# Patient Record
Sex: MALE | Race: BLACK OR AFRICAN AMERICAN | NOT HISPANIC OR LATINO | URBAN - METROPOLITAN AREA
[De-identification: names, ages, dates, MRNs, and addresses within clinical notes are randomized per-mention and may not be internally consistent; named-entity substitution may affect disease eponyms.]

---

## 2021-04-13 DIAGNOSIS — Z23 ENCOUNTER FOR IMMUNIZATION: ICD-10-CM

## 2023-06-14 ENCOUNTER — APPOINTMENT (RX ONLY)
Dept: URBAN - METROPOLITAN AREA CLINIC 23 | Facility: CLINIC | Age: 53
Setting detail: DERMATOLOGY
End: 2023-06-14

## 2023-06-14 DIAGNOSIS — L91.8 OTHER HYPERTROPHIC DISORDERS OF THE SKIN: ICD-10-CM

## 2023-06-14 DIAGNOSIS — B07.8 OTHER VIRAL WARTS: ICD-10-CM

## 2023-06-14 PROCEDURE — 99202 OFFICE O/P NEW SF 15 MIN: CPT

## 2023-06-14 PROCEDURE — ? COUNSELING

## 2023-06-14 PROCEDURE — ? CPT CODE GENERATOR

## 2023-06-14 ASSESSMENT — LOCATION SIMPLE DESCRIPTION DERM: LOCATION SIMPLE: LEFT UPPER BACK

## 2023-06-14 ASSESSMENT — LOCATION DETAILED DESCRIPTION DERM: LOCATION DETAILED: LEFT SUPERIOR LATERAL UPPER BACK

## 2023-06-14 ASSESSMENT — LOCATION ZONE DERM: LOCATION ZONE: TRUNK

## 2023-06-14 NOTE — PROCEDURE: CPT CODE GENERATOR
33536 Price: 0.00
Was Pdt Performed By Md, , Melissa Or Np?: No
Fee Schedule Discount For Cash Pay Patients In % Off Of Fee Schedule: 0
First Procedure You Would Like A Quote For?: Benign Destruction
Anticipated Depth And Size Of Soft Tissue Excision (Required): Subcutaneous, Less than 3 cm
J Code Units: 1
First Biopsy Type: Tangential Biopsy
How Many Lesions Are You Destroying?: Less than 15
Number Of Lesions Injected: Less than 8 lesions
Fee Schedule Discount In % Off Of Fee Schedule: Standard Fee Schedule as Entered in Pricing Tab
Anticipated Depth And Size Of Soft Tissue Excision (Required): Subcutaneous, Less than 2 cm
Which Photosensitizer Was Used?: Levulan
Anticipated Depth And Size Of Soft Tissue Excision (Required): Subcutaneous, Less than 1.5 cm
Detail Level: None

## 2023-06-29 ENCOUNTER — APPOINTMENT (RX ONLY)
Dept: URBAN - METROPOLITAN AREA CLINIC 23 | Facility: CLINIC | Age: 53
Setting detail: DERMATOLOGY
End: 2023-06-29

## 2023-06-29 DIAGNOSIS — B07.8 OTHER VIRAL WARTS: ICD-10-CM

## 2023-06-29 DIAGNOSIS — L91.8 OTHER HYPERTROPHIC DISORDERS OF THE SKIN: ICD-10-CM

## 2023-06-29 PROCEDURE — ? COUNSELING

## 2023-06-29 PROCEDURE — ? BENIGN DESTRUCTION

## 2023-06-29 PROCEDURE — 17110 DESTRUCTION B9 LES UP TO 14: CPT

## 2023-06-29 PROCEDURE — ? LIQUID NITROGEN

## 2023-06-29 ASSESSMENT — LOCATION DETAILED DESCRIPTION DERM
LOCATION DETAILED: LEFT AXILLARY VAULT
LOCATION DETAILED: LEFT POSTERIOR AXILLA
LOCATION DETAILED: RIGHT CENTRAL LATERAL NECK
LOCATION DETAILED: LEFT SUPERIOR LATERAL UPPER BACK
LOCATION DETAILED: RIGHT AXILLARY VAULT
LOCATION DETAILED: LEFT CENTRAL LATERAL NECK

## 2023-06-29 ASSESSMENT — LOCATION SIMPLE DESCRIPTION DERM
LOCATION SIMPLE: LEFT AXILLARY VAULT
LOCATION SIMPLE: RIGHT AXILLARY VAULT
LOCATION SIMPLE: LEFT UPPER BACK
LOCATION SIMPLE: NECK
LOCATION SIMPLE: LEFT POSTERIOR AXILLA

## 2023-06-29 ASSESSMENT — LOCATION ZONE DERM
LOCATION ZONE: TRUNK
LOCATION ZONE: NECK
LOCATION ZONE: AXILLAE

## 2023-06-29 NOTE — PROCEDURE: LIQUID NITROGEN
Render Post-Care Instructions In Note?: no
Show Spray Paint Technique Variable?: Yes
Spray Paint Text: The liquid nitrogen was applied to the skin utilizing a spray paint frosting technique.
Medical Necessity Clause: This procedure was medically necessary because the lesions that were treated were:
Medical Necessity Information: It is in your best interest to select a reason for this procedure from the list below. All of these items fulfill various CMS LCD requirements except the new and changing color options.
Detail Level: Detailed
Consent: The patient's consent was obtained including but not limited to risks of crusting, scabbing, blistering, scarring, darker or lighter pigmentary change, recurrence, incomplete removal and infection.
Number Of Freeze-Thaw Cycles: 3 freeze-thaw cycles
Post-Care Instructions: I reviewed with the patient in detail post-care instructions. Patient is to wear sunprotection, and avoid picking at any of the treated lesions. Pt may apply Vaseline to crusted or scabbing areas.

## 2023-06-29 NOTE — PROCEDURE: BENIGN DESTRUCTION
Number Of Freeze-Thaw Cycles: 3 freeze-thaw cycles
Render Post-Care Instructions In Note?: no
Treatment Number (Will Not Render If 0): 0
Medical Necessity Information: It is in your best interest to select a reason for this procedure from the list below. All of these items fulfill various CMS LCD requirements except the new and changing color options.
Detail Level: Detailed
Topical Anesthesia?: 7% lidocaine, 7% tetracaine
Post-Care Instructions: I reviewed with the patient in detail post-care instructions. Patient is to wear sunprotection, and avoid picking at any of the treated lesions. Pt may apply Vaseline to crusted or scabbing areas.
Medical Necessity Clause: This procedure was medically necessary because the lesions that were treated were:
Consent: The patient's verbal consent was obtained including but not limited to risks of crusting, scabbing, blistering, scarring, darker or lighter pigmentary change, recurrence, incomplete removal and infection.

## 2024-10-22 ENCOUNTER — APPOINTMENT (RX ONLY)
Dept: URBAN - METROPOLITAN AREA CLINIC 23 | Facility: CLINIC | Age: 54
Setting detail: DERMATOLOGY
End: 2024-10-22

## 2024-10-22 DIAGNOSIS — L98419 CHRONIC ULCER OF OTHER SPECIFIED SITES: ICD-10-CM

## 2024-10-22 DIAGNOSIS — L98429 CHRONIC ULCER OF OTHER SPECIFIED SITES: ICD-10-CM

## 2024-10-22 DIAGNOSIS — B07.8 OTHER VIRAL WARTS: ICD-10-CM

## 2024-10-22 DIAGNOSIS — L91.8 OTHER HYPERTROPHIC DISORDERS OF THE SKIN: ICD-10-CM

## 2024-10-22 PROBLEM — D48.5 NEOPLASM OF UNCERTAIN BEHAVIOR OF SKIN: Status: ACTIVE | Noted: 2024-10-22

## 2024-10-22 PROBLEM — L97.319 NON-PRESSURE CHRONIC ULCER OF RIGHT ANKLE WITH UNSPECIFIED SEVERITY: Status: ACTIVE | Noted: 2024-10-22

## 2024-10-22 PROCEDURE — ? COUNSELING

## 2024-10-22 PROCEDURE — ? DEFER

## 2024-10-22 PROCEDURE — 99213 OFFICE O/P EST LOW 20 MIN: CPT | Mod: 25

## 2024-10-22 PROCEDURE — ? BIOPSY BY SHAVE METHOD

## 2024-10-22 PROCEDURE — ? OTC TREATMENT REGIMEN

## 2024-10-22 PROCEDURE — 11102 TANGNTL BX SKIN SINGLE LES: CPT

## 2024-10-22 ASSESSMENT — LOCATION DETAILED DESCRIPTION DERM
LOCATION DETAILED: RIGHT ANKLE
LOCATION DETAILED: RIGHT LATERAL INFERIOR EYELID
LOCATION DETAILED: LEFT CLAVICULAR NECK
LOCATION DETAILED: LEFT AXILLARY VAULT
LOCATION DETAILED: LEFT INFERIOR LATERAL NECK
LOCATION DETAILED: RIGHT AXILLARY VAULT

## 2024-10-22 ASSESSMENT — LOCATION ZONE DERM
LOCATION ZONE: AXILLAE
LOCATION ZONE: LEG
LOCATION ZONE: NECK
LOCATION ZONE: EYELID

## 2024-10-22 ASSESSMENT — LOCATION SIMPLE DESCRIPTION DERM
LOCATION SIMPLE: LEFT AXILLARY VAULT
LOCATION SIMPLE: RIGHT ANKLE
LOCATION SIMPLE: RIGHT INFERIOR EYELID
LOCATION SIMPLE: RIGHT AXILLARY VAULT
LOCATION SIMPLE: LEFT ANTERIOR NECK

## 2024-10-22 NOTE — PROCEDURE: BIOPSY BY SHAVE METHOD
Detail Level: Detailed
Depth Of Biopsy: dermis
Was A Bandage Applied: Yes
Size Of Lesion In Cm: 0
Biopsy Type: H and E
Biopsy Method: scissors
Anesthesia Type: 1% lidocaine without epinephrine
Anesthesia Volume In Cc: 1
Hemostasis: Drysol
Wound Care: Petrolatum
Dressing: bandage
Destruction After The Procedure: No
Type Of Destruction Used: Curettage
Curettage Text: The wound bed was treated with curettage after the biopsy was performed.
Cryotherapy Text: The wound bed was treated with cryotherapy after the biopsy was performed.
Electrodesiccation Text: The wound bed was treated with electrodesiccation after the biopsy was performed.
Electrodesiccation And Curettage Text: The wound bed was treated with electrodesiccation and curettage after the biopsy was performed.
Silver Nitrate Text: The wound bed was treated with silver nitrate after the biopsy was performed.
Lab: -[id number]
Lab Facility: [id number]
Consent: Written consent was obtained and risks were reviewed including but not limited to scarring, infection, bleeding, scabbing, incomplete removal, nerve damage and allergy to anesthesia.
Post-Care Instructions: I reviewed with the patient in detail post-care instructions. Patient is to keep the biopsy site dry overnight, and then apply bacitracin twice daily until healed. Patient may apply hydrogen peroxide soaks to remove any crusting.
Notification Instructions: Patient will be notified of biopsy results. However, patient instructed to call the office if not contacted within 2 weeks.
Billing Type: Third-Party Bill
Information: Selecting Yes will display possible errors in your note based on the variables you have selected. This validation is only offered as a suggestion for you. PLEASE NOTE THAT THE VALIDATION TEXT WILL BE REMOVED WHEN YOU FINALIZE YOUR NOTE. IF YOU WANT TO FAX A PRELIMINARY NOTE YOU WILL NEED TO TOGGLE THIS TO 'NO' IF YOU DO NOT WANT IT IN YOUR FAXED NOTE.

## 2024-10-28 ENCOUNTER — APPOINTMENT (RX ONLY)
Dept: URBAN - METROPOLITAN AREA CLINIC 23 | Facility: CLINIC | Age: 54
Setting detail: DERMATOLOGY
End: 2024-10-28

## 2024-10-28 DIAGNOSIS — L91.8 OTHER HYPERTROPHIC DISORDERS OF THE SKIN: ICD-10-CM

## 2024-10-28 PROCEDURE — 11200 RMVL SKIN TAGS UP TO&INC 15: CPT

## 2024-10-28 PROCEDURE — ? SKIN TAG REMOVAL MULTI

## 2024-10-28 ASSESSMENT — LOCATION SIMPLE DESCRIPTION DERM
LOCATION SIMPLE: RIGHT AXILLARY VAULT
LOCATION SIMPLE: LEFT ANTERIOR NECK
LOCATION SIMPLE: RIGHT ANTERIOR NECK
LOCATION SIMPLE: LEFT AXILLARY VAULT

## 2024-10-28 ASSESSMENT — LOCATION ZONE DERM
LOCATION ZONE: NECK
LOCATION ZONE: AXILLAE

## 2024-10-28 ASSESSMENT — LOCATION DETAILED DESCRIPTION DERM
LOCATION DETAILED: LEFT CLAVICULAR NECK
LOCATION DETAILED: RIGHT CLAVICULAR NECK
LOCATION DETAILED: RIGHT AXILLARY VAULT
LOCATION DETAILED: LEFT AXILLARY VAULT

## 2024-10-28 NOTE — PROCEDURE: SKIN TAG REMOVAL MULTI
Medical Necessity Clause: This procedure was medically necessary because the lesions that were treated were:
Hemostasis: Aluminum Chloride
Total Number Of Lesions Treated: 1
Medical Necessity Information: It is in your best interest to select a reason for this procedure from the list below. All of these items fulfill various CMS LCD requirements except the new and changing color options.
Consent: Written consent obtained and the risks of skin tag removal was reviewed with the patient including but not limited to bleeding, pigmentary change, infection, pain, and remote possibility of scarring.
Detail Level: Detailed
Add Associated Diagnoses If Applicable When Selecting Medical Necessity: Yes
Include Z78.9 (Other Specified Conditions Influencing Health Status) As An Associated Diagnosis?: No
Anesthesia Type: 1% lidocaine with epinephrine

## 2025-07-29 ENCOUNTER — OFFICE VISIT (OUTPATIENT)
Dept: WOUND CARE | Facility: HOSPITAL | Age: 55
End: 2025-07-29
Payer: COMMERCIAL

## 2025-07-29 VITALS — OXYGEN SATURATION: 97 % | RESPIRATION RATE: 16 BRPM | HEART RATE: 67 BPM | TEMPERATURE: 97.7 F

## 2025-07-29 DIAGNOSIS — S91.001A OPEN WOUND OF RIGHT ANKLE, INITIAL ENCOUNTER: ICD-10-CM

## 2025-07-29 DIAGNOSIS — S91.302A OPEN WOUND OF LEFT FOOT, INITIAL ENCOUNTER: Primary | ICD-10-CM

## 2025-07-29 DIAGNOSIS — Z79.4 INSULIN DEPENDENT TYPE 2 DIABETES MELLITUS (CMS/HCC): ICD-10-CM

## 2025-07-29 DIAGNOSIS — E11.9 INSULIN DEPENDENT TYPE 2 DIABETES MELLITUS (CMS/HCC): ICD-10-CM

## 2025-07-29 PROCEDURE — 99900024 HB NONBILLABLE HOSPITAL CLINIC SERVICE: Performed by: SURGERY

## 2025-07-29 PROCEDURE — 99900024 HB NONBILLABLE HOSPITAL CLINIC SERVICE

## 2025-07-29 PROCEDURE — A6212 FOAM DRG <=16 SQ IN W/BORDER: HCPCS

## 2025-07-29 RX ORDER — LANOLIN ALCOHOL/MO/W.PET/CERES
400 CREAM (GRAM) TOPICAL DAILY
COMMUNITY

## 2025-07-29 RX ORDER — VALSARTAN AND HYDROCHLOROTHIAZIDE 320; 25 MG/1; MG/1
1 TABLET, FILM COATED ORAL DAILY
COMMUNITY

## 2025-07-29 RX ORDER — ISOSORBIDE MONONITRATE 60 MG/1
60 TABLET, EXTENDED RELEASE ORAL DAILY
COMMUNITY

## 2025-07-29 RX ORDER — CARVEDILOL 6.25 MG/1
6.25 TABLET ORAL
COMMUNITY

## 2025-07-29 RX ORDER — METFORMIN HYDROCHLORIDE 500 MG/1
500 TABLET ORAL
COMMUNITY

## 2025-07-29 RX ORDER — SERTRALINE HYDROCHLORIDE 100 MG/1
100 TABLET, FILM COATED ORAL DAILY
COMMUNITY

## 2025-07-29 RX ORDER — INSULIN ASPART 100 [IU]/ML
60 INJECTION, SUSPENSION SUBCUTANEOUS
COMMUNITY

## 2025-07-29 RX ORDER — ATORVASTATIN CALCIUM 40 MG/1
40 TABLET, FILM COATED ORAL DAILY
COMMUNITY

## 2025-07-29 RX ORDER — SPIRONOLACTONE 100 MG/1
100 TABLET, FILM COATED ORAL DAILY
COMMUNITY

## 2025-07-29 ASSESSMENT — ENCOUNTER SYMPTOMS
EYE DISCHARGE: 0
NUMBNESS: 0
SHORTNESS OF BREATH: 0
COUGH: 0
NERVOUS/ANXIOUS: 0
APPETITE CHANGE: 0
COLOR CHANGE: 0
HEMATURIA: 0
ABDOMINAL DISTENTION: 0
BRUISES/BLEEDS EASILY: 0
EYE REDNESS: 0
FATIGUE: 0
RHINORRHEA: 0
CONFUSION: 0
JOINT SWELLING: 0
WOUND: 1
FLANK PAIN: 0
FEVER: 0
POLYDIPSIA: 0
WEAKNESS: 0
FREQUENCY: 0

## 2025-07-29 NOTE — PROGRESS NOTES
Patient ID: Justin Hoyt                            : 1970  MRN: [id number]                                            Visit Date: 2025  Encounter Provider: REYNOLD Dahl  Referring Provider: No ref. provider found    Subjective      HPI  Justin is a 54 y.o. old male with a chief compliant of Wound Care (BLE)   presenting today for evaluation and management of a wound of the left foot and right posterior calf.  The patient is a poorly controlled diabetic with a current A1c of 8.7, which was 9.5 previously.  He does suffer with peripheral neuropathy.  On 2025 plain x-rays of the left foot were obtained.  There was no evidence of fracture/dislocation or osteomyelitis.  On 2025 he underwent an MRI of the left foot with and without contrast.  This showed no evidence of osteomyelitis.  Progressive arthritis noted.  No fluid collection or abscess seen.  The patient does wear diabetic shoes.  Additionally, he presents with a traumatic wound over his right Achilles tendon.  This has been present now for several weeks.  No fevers noted.  He has not been placed on antibiotics.  Workup so far has been through his PCP Dr. Nolan Martínez.    X-ray foot left 3+ views Routine    Anatomical Region Laterality Modality   Lower Extremities left Digital Radiography   Foot -- --     Impression      No radiographic evidence of an acute osteomyelitis.    Further workup with MRI is recommended    Signed By: Martinez Hawthorne MD   Signed On: 2025 7:50 AM EDT  Narrative    EXAM TYPE: XR FOOT 3+ VW LEFT  EXAM DATE AND TIME: 2025 2:26 PM EDT  INDICATION: open wound of left foot  COMPARISON: There are no prior studies available for comparison.    TECHNIQUE: 3 views of the left foot were obtained.    FINDINGS: There is normal anatomic alignment. No acute fracture or dislocation is identified. There is no blastic or lytic lesion. Calcaneal spur as well as spur at the insertion of Achilles tendon are noted.  There is no soft tissue swelling.    MRI foot left with and without contrast STAT    Anatomical Region Laterality Modality   Lower Extremities left Magnetic Resonance   Foot -- --     Impression    1. No evidence of osteomyelitis.  2. Severe osteoarthritis at the middle cuneiform-second metatarsal joint. This has progressed significantly since the prior examination.  Narrative    Exam Type: MRI FOOT LEFT W WO CONTRAST  Exam Date and Time: 5/21/2025 1:31 PM EDT  Indication: Diabetic ulcer of left midfoot associated with type 2 diabetes mellitus; deep wound in foot; severe diabetic neuropathy  Comparison: 2/7/2024    TECHNIQUE:  MRI of the left forefoot was performed on a 1.5 Dorene system in three planes (axial, sagittal, and coronal) using a standard protocol with sequences obtained prior to and following the uneventful intravenous administration of 10 cc Gadavist gadolinium  contrast material.    FINDINGS:    There is severe osteoarthritis at the middle cuneiform-second metatarsal joint. There is moderate subcortical marrow edema in the proximal aspect of the second metatarsal bone and mild marrow edema in the distal aspect of the middle cuneiform bone. There   is mild subcortical cystic change. The findings have progressed significantly since the prior examination where only mild degenerative changes were present.    There is no evidence of significant marrow edema or enhancement to suggest osteomyelitis.    There are fractures identified.    There is no evidence of a rim-enhancing fluid collection in the soft tissues to suggest an abscess.    The following have been reviewed and updated as appropriate in this visit:   Allergies  Meds  Problems  Med Hx  Surg Hx  Fam Hx       Past Medical History:  has a past medical history of Arthritis, Diabetes mellitus type I (CMS/HCC), Hypertension, and Lipid disorder.  Past Surgical History:  has no past surgical history on file.  Social History:   Social History      Tobacco Use    Smoking status: Never    Smokeless tobacco: Never   Substance Use Topics    Alcohol use: Never    Drug use: Never     Family History: family history is not on file.  Medications:   Current Outpatient Medications:     atorvastatin (LIPITOR) 40 mg tablet, Take 40 mg by mouth daily., Disp: , Rfl:     carvediloL (COREG) 6.25 mg tablet, Take 6.25 mg by mouth 2 (two) times a day with breakfast and dinner., Disp: , Rfl:     empagliflozin (JARDIANCE) 25 mg tablet, Take 25 mg by mouth daily., Disp: , Rfl:     insulin asp prt-insulin aspart (NovoLOG MIX 70/30) 100 unit/mL (70-30) pen, Inject 60 Units under the skin 2 (two) times a day before breakfast and dinner., Disp: , Rfl:     isosorbide mononitrate (IMDUR) 60 mg 24 hr tablet, Take 60 mg by mouth daily., Disp: , Rfl:     magnesium oxide (MAG-OX) 400 mg (241.3 mg magnesium) tablet, Take 400 mg by mouth daily., Disp: , Rfl:     metFORMIN (GLUCOPHAGE) 500 mg tablet, Take 500 mg by mouth 2 (two) times a day with breakfast and dinner., Disp: , Rfl:     sertraline (ZOLOFT) 100 mg tablet, Take 100 mg by mouth daily., Disp: , Rfl:     spironolactone (ALDACTONE) 100 mg tablet, Take 100 mg by mouth daily., Disp: , Rfl:     valsartan-hydrochlorothiazide (DIOVAN-HCT) 320-25 mg per tablet, Take 1 tablet by mouth daily., Disp: , Rfl:     vitamin B complex (B-50 COMPLEX ORAL), Take by mouth., Disp: , Rfl:     Allergies: is allergic to iodinated contrast media and iodine.     Review of Systems   Constitutional:  Negative for appetite change, fatigue and fever.   HENT:  Negative for congestion and rhinorrhea.    Eyes:  Negative for discharge and redness.   Respiratory:  Negative for cough and shortness of breath.    Cardiovascular:  Negative for leg swelling.   Gastrointestinal:  Negative for abdominal distention.   Endocrine: Negative for cold intolerance, heat intolerance and polydipsia.   Genitourinary:  Negative for flank pain, frequency and hematuria.    Musculoskeletal:  Negative for gait problem and joint swelling.   Skin:  Positive for wound. Negative for color change.   Allergic/Immunologic: Negative for immunocompromised state.   Neurological:  Negative for weakness and numbness.   Hematological:  Does not bruise/bleed easily.   Psychiatric/Behavioral:  Negative for confusion. The patient is not nervous/anxious.      Objective   Visit Vitals  Pulse 67   Temp 36.5 °C (97.7 °F)   Resp 16   SpO2 97%       Physical Exam  Vitals and nursing note reviewed.   Constitutional:       General: He is not in acute distress.     Appearance: Normal appearance.   HENT:      Head: Normocephalic and atraumatic.   Eyes:      Conjunctiva/sclera: Conjunctivae normal.      Pupils: Pupils are equal, round, and reactive to light.   Neck:      Trachea: Trachea normal.   Cardiovascular:      Rate and Rhythm: Regular rhythm.   Pulmonary:      Effort: Pulmonary effort is normal. No respiratory distress.      Breath sounds: No wheezing.   Abdominal:      Palpations: Abdomen is soft.      Tenderness: There is no guarding.   Musculoskeletal:         General: Swelling present. No deformity. Normal range of motion.      Cervical back: Normal range of motion. No edema or erythema.      Comments: 1.  The patient has a left plantar wound over the fourth metatarsal head.  This probes down to bone.  This is a full-thickness wound with moderate drainage.  There is a large callus superficially.  Some drainage can be exposed.  It does not look purulent.  I am sure the area is colonized.  There is no erythema.  Patient has neuropathy.  He has palpable pedal pulses.    2.  He has a traumatic wound over the right Achilles tendon.  Black eschar is noted.  Some periwound erythema with induration.  This is a full-thickness wound with moderate drainage.  No obvious sign of skin or soft tissue infection.   Skin:     General: Skin is warm.      Capillary Refill: Capillary refill takes less than 2 seconds.    Neurological:      Mental Status: He is alert and oriented to person, place, and time.   Psychiatric:         Attention and Perception: He is attentive.         Mood and Affect: Affect is not inappropriate.         Speech: Speech normal.         Behavior: Behavior normal. Behavior is cooperative.       Plantar ulcer left foot            Traumatic wound overlying right Achilles tendon    During visit patient received cleansing with gauze and saline to all wounds to assist in removing bioburden and loosely adhered slough    Assessment/Plan    Diagnosis Plan   1. Open wound of left foot, initial encounter        2. Open wound of right ankle, initial encounter        3. Insulin dependent type 2 diabetes mellitus (CMS/HCC)          Problem List Items Addressed This Visit          Unprioritized    Insulin dependent type 2 diabetes mellitus (CMS/HCC)    Plan:  The patient's A1c is still above 8.  It is trending downward, but needs to get lower before we can anticipate any serious wound healing potential.  Patient is working with his primary care physician and trying to accomplish this goal.         Relevant Medications    metFORMIN (GLUCOPHAGE) 500 mg tablet    empagliflozin (JARDIANCE) 25 mg tablet    insulin asp prt-insulin aspart (NovoLOG MIX 70/30) 100 unit/mL (70-30) pen    Open wound of left foot - Primary    Plan:  Medical grade manuka honey will be applied to this site also.  Cover the site with a silicone bordered superabsorber, such as Excel SAP.  Change dressing on a daily basis.  Try to offload the area as best as possible.    This wound will require further debridement, best performed by a podiatric surgeon.  He had an MRI 2 months ago that showed no evidence of osteomyelitis.  Debridement will be carried down to bone.    I have referred the patient to Dr. Claudy Parker at the [facility].  An appointment will be made to see Dr. Parker in about 2 weeks.  Better offloading options will be  presented to the patient at that time.         Open wound of right ankle    Plan:  The patient will purchase medical grade manuka honey.  Apply manuka honey to the black eschar and cover with a silicone bordered superabsorber, such as Excel SAP.  Change dressing on a daily basis.  Wash the site thoroughly with warm water and soap.        This document was generated using speech recognition software. Please excuse any typographical errors.    Return in about 2 weeks (around 8/12/2025).     REYNOLD Dahl MD

## 2025-07-29 NOTE — ASSESSMENT & PLAN NOTE
Plan:  The patient's A1c is still above 8.  It is trending downward, but needs to get lower before we can anticipate any serious wound healing potential.  Patient is working with his primary care physician and trying to accomplish this goal.

## 2025-07-29 NOTE — ASSESSMENT & PLAN NOTE
Plan:  Medical grade manuka honey will be applied to this site also.  Cover the site with a silicone bordered superabsorber, such as Excel SAP.  Change dressing on a daily basis.  Try to offload the area as best as possible.    This wound will require further debridement, best performed by a podiatric surgeon.  He had an MRI 2 months ago that showed no evidence of osteomyelitis.  Debridement will be carried down to bone.    I have referred the patient to Dr. Claudy Parker at the [facility].  An appointment will be made to see Dr. Parker in about 2 weeks.  Better offloading options will be presented to the patient at that time.

## 2025-07-29 NOTE — LETTER
[facility]  [facility]  [address]  [facility] [id number] [phone]       25    PHYSICIAN ORDER FOR:    Name: Justin Hoyt   : 1970  Patient Phone: [phone]  Patient Address:   [address] [place] [address] [place] [address]     Primary Insurance Name: [employer]   Primary Insurance #: [id number]  Secondary Insurance: N/A  Secondary Insurance #: none    Order Date: 25  Supply Order Length of Time: 2 weeks  Frequency of Change: daily    Supplies Needed:  *Specify exact type and quantity*    Silicone bordered super absorber     Supplies can be filled with an equivalent or comparable substitute if brand specific item is not carried or available.     Primary Diagnosis: No primary diagnosis found.   All Visit Diagnoses: No diagnosis found.     MD REYNOLD Spencer MD     Return Fax:  Phone [phone]  Fax  [phone]     Wound(s) cleansed/mechanically debrided with gauze and wound wash/saline prior to assessment.    Wound Care Documentation:    Wound Right;Posterior Leg (Active)   Wound Image    25   Wound WDL appearance;drainage;dressing;wound care / interventions 25   Wound Appearance necrotic;gray;full thickness 25   Periwound Appearance Intact 25   Drainage Characteristics/Odor tan;yellow 25   Drainage Amount moderate 25   Dressing Appearance -- (occasionally applies dressing per patient; no dressing on arrival) 25   Dressing -- (silicone bordered super absorber) 25   Frequency of Dressing Change Daily 25   Wound Care Interventions  cleansed/irrigated with wound cleanser;debrided - mechanical;medical grade honey 25   Wound Length (cm) 1.9 cm 25   Wound Width (cm) 2 cm 25   Wound Depth (cm) 0.3 cm 25   Wound Surface Area (cm^2) 2.98 cm^2 25   Wound Volume  (cm^3) 0.597 cm^3 25   Additional Documentation Slough % 25   Slough % -- (see photo) 25       Wound Left;Plantar Foot (Active)   Wound Image    25   Wound WDL appearance;drainage;dressing;wound care / interventions 25   Wound Appearance moist;pink;full thickness 25   Periwound Appearance Intact 25   Drainage Characteristics/Odor serosanguineous 25   Drainage Amount moderate 25   Dressing Appearance moist drainage 25   Dressing -- (silicone bordered super absorber) 25   Frequency of Dressing Change Daily 25   Wound Care Interventions  cleansed/irrigated with wound cleanser;debrided - mechanical;medical grade honey 25   Wound Length (cm) 0.8 cm 25   Wound Width (cm) 0.5 cm 25   Wound Depth (cm) 0.8 cm 25   Wound Surface Area (cm^2) 0.31 cm^2 25   Wound Volume (cm^3) 0.168 cm^3 25        Progress Notes:  Patient ID: Justin Hoyt                            : 1970  MRN: [id number]                                            Visit Date: 2025  Encounter Provider: REYNOLD Dahl  Referring Provider: No ref. provider found    Subjective     HPI  Justin is a 54 y.o. old male with a chief compliant of Wound Care (BLE)   presenting today for evaluation and management of a wound of the left foot and right posterior calf.    The following have been reviewed and updated as appropriate in this visit:   Allergies  Meds  Problems  Med Hx  Surg Hx  Fam Hx       Past Medical History:  has no past medical history on file.  Past Surgical History:  has no past surgical history on file.  Social History:    Family History: family history is not on file.  Medications: No current outpatient medications on file.    Allergies: is allergic to iodinated contrast media and iodine.     Review of Systems   Constitutional:   Negative for appetite change, fatigue and fever.   HENT:  Negative for congestion and rhinorrhea.    Eyes:  Negative for discharge and redness.   Respiratory:  Negative for cough and shortness of breath.    Cardiovascular:  Negative for leg swelling.   Gastrointestinal:  Negative for abdominal distention.   Endocrine: Negative for cold intolerance, heat intolerance and polydipsia.   Genitourinary:  Negative for flank pain, frequency and hematuria.   Musculoskeletal:  Negative for gait problem and joint swelling.   Skin:  Positive for wound. Negative for color change.   Allergic/Immunologic: Negative for immunocompromised state.   Neurological:  Negative for weakness and numbness.   Hematological:  Does not bruise/bleed easily.   Psychiatric/Behavioral:  Negative for confusion. The patient is not nervous/anxious.      Objective  Visit Vitals  Pulse 67   Temp 36.5 °C (97.7 °F)   Resp 16   SpO2 97%       Physical Exam  Vitals and nursing note reviewed.   Constitutional:       General: He is not in acute distress.     Appearance: Normal appearance.   HENT:      Head: Normocephalic and atraumatic.   Eyes:      Conjunctiva/sclera: Conjunctivae normal.      Pupils: Pupils are equal, round, and reactive to light.   Neck:      Trachea: Trachea normal.   Cardiovascular:      Rate and Rhythm: Regular rhythm.   Pulmonary:      Effort: Pulmonary effort is normal. No respiratory distress.      Breath sounds: No wheezing.   Abdominal:      Palpations: Abdomen is soft.      Tenderness: There is no guarding.   Musculoskeletal:         General: Swelling present. No deformity. Normal range of motion.      Cervical back: Normal range of motion. No edema or erythema.   Skin:     General: Skin is warm.      Capillary Refill: Capillary refill takes less than 2 seconds.   Neurological:      Mental Status: He is alert and oriented to person, place, and time.   Psychiatric:         Attention and Perception: He is attentive.         Mood and  Affect: Affect is not inappropriate.         Speech: Speech normal.         Behavior: Behavior normal. Behavior is cooperative.       During visit patient received cleansing with gauze and saline to all wounds to assist in removing bioburden and loosely adhered slough    Assessment/Plan  No diagnosis found.  Problem List Items Addressed This Visit    None  This document was generated using speech recognition software. Please excuse any typographical errors.    No follow-ups on file.     REYNOLD Dahl MD

## 2025-07-29 NOTE — ASSESSMENT & PLAN NOTE
Plan:  The patient will purchase medical grade manuka honey.  Apply manuka honey to the black eschar and cover with a silicone bordered superabsorber, such as Excel SAP.  Change dressing on a daily basis.  Wash the site thoroughly with warm water and soap.

## 2025-07-29 NOTE — PATIENT INSTRUCTIONS
[facility] Instructions    MEDICATIONS     Medication Note  Continue present medications as prescribed by the [facility] or other physicians you see. To avoid any problems keep the [facility] informed each visit of any medications changes that occur.     WOUND CARE     Clean Wound with: Soap and Water   Treatment 1   Location: right posterior leg, left foot  Dressing: Purchase Medihoney (or ACTIVON medical grade honey) through [employer].  Apply Medihoney to dressings and apply to wounds.   Dressing Care Frequency: Daily  Care Provider: Self       Basic Principles      Wash your hands thoroughly with soap and water and after each dressing change. If someone other than the patient changes the dressing, it’s best to wear disposable gloves.   Do not get the wound or dressing wet.   To shower: remove the dressing, shower with soap and water (including washing the wound - do not use a washcloth), air dry, then redress the wound.  Do not take a tub bath  Keep all your dressings in a clean, covered container at home to avoid dust and contamination.  Discard used dressings in a plastic bag or covered trash container.  Check your wound and the surrounding skin at each dressing change for redness, warmth, swelling, increased pain, foul odor, fever, pus or abnormal drainage or discharge.  Notify [facility] if any of these changes occur - [phone].      Nutrition  Eat a well, balanced diet with adequate protein to support wound healing.   Take a multivitamin every day. Adequate nutrition supports healing and new tissue growth.  All diabetic patients should strive to keep blood sugars within a normal, practical range.   Elevated blood sugars can delay your wound healing.        ACTIVITY     Normal activity then rest and elevation  Limit activities    MOBILITY     Partial Weight Bearing Only  -  continue to wear your offloading shoe on left foot and bring to your next visit.

## 2025-08-12 ENCOUNTER — OFFICE VISIT (OUTPATIENT)
Dept: WOUND CARE | Facility: HOSPITAL | Age: 55
End: 2025-08-12
Payer: COMMERCIAL

## 2025-08-12 VITALS — RESPIRATION RATE: 18 BRPM | HEART RATE: 85 BPM | TEMPERATURE: 97.3 F

## 2025-08-12 DIAGNOSIS — L97.522 DIABETIC ULCER OF TOE OF LEFT FOOT ASSOCIATED WITH TYPE 2 DIABETES MELLITUS, WITH FAT LAYER EXPOSED (CMS/HCC): ICD-10-CM

## 2025-08-12 DIAGNOSIS — L97.911 NON-PRESSURE CHRONIC ULCER OF RIGHT LOWER LEG, LIMITED TO BREAKDOWN OF SKIN (CMS/HCC): Primary | ICD-10-CM

## 2025-08-12 DIAGNOSIS — L97.522 ULCER OF TOE OF LEFT FOOT, WITH FAT LAYER EXPOSED (CMS/HCC): ICD-10-CM

## 2025-08-12 DIAGNOSIS — E11.621 DIABETIC ULCER OF TOE OF LEFT FOOT ASSOCIATED WITH TYPE 2 DIABETES MELLITUS, WITH FAT LAYER EXPOSED (CMS/HCC): ICD-10-CM

## 2025-08-12 PROCEDURE — A6212 FOAM DRG <=16 SQ IN W/BORDER: HCPCS

## 2025-08-12 PROCEDURE — 99900024 HB NONBILLABLE HOSPITAL CLINIC SERVICE

## 2025-08-26 ENCOUNTER — OFFICE VISIT (OUTPATIENT)
Dept: WOUND CARE | Facility: HOSPITAL | Age: 55
End: 2025-08-26
Payer: COMMERCIAL

## 2025-08-26 VITALS — HEART RATE: 78 BPM | RESPIRATION RATE: 18 BRPM | TEMPERATURE: 97 F

## 2025-08-26 DIAGNOSIS — L97.911 NON-PRESSURE CHRONIC ULCER OF RIGHT LOWER LEG, LIMITED TO BREAKDOWN OF SKIN (CMS/HCC): Primary | ICD-10-CM

## 2025-08-26 DIAGNOSIS — L97.522 ULCER OF TOE OF LEFT FOOT, WITH FAT LAYER EXPOSED (CMS/HCC): ICD-10-CM

## 2025-08-26 DIAGNOSIS — E11.621 DIABETIC ULCER OF TOE OF LEFT FOOT ASSOCIATED WITH TYPE 2 DIABETES MELLITUS, WITH FAT LAYER EXPOSED (CMS/HCC): ICD-10-CM

## 2025-08-26 DIAGNOSIS — L97.522 DIABETIC ULCER OF TOE OF LEFT FOOT ASSOCIATED WITH TYPE 2 DIABETES MELLITUS, WITH FAT LAYER EXPOSED (CMS/HCC): ICD-10-CM

## 2025-08-26 PROCEDURE — A6212 FOAM DRG <=16 SQ IN W/BORDER: HCPCS

## 2025-08-26 PROCEDURE — 99900024 HB NONBILLABLE HOSPITAL CLINIC SERVICE: Performed by: PODIATRIST

## 2025-08-26 PROCEDURE — 99900024 HB NONBILLABLE HOSPITAL CLINIC SERVICE
